# Patient Record
Sex: FEMALE | Race: WHITE | Employment: OTHER | URBAN - METROPOLITAN AREA
[De-identification: names, ages, dates, MRNs, and addresses within clinical notes are randomized per-mention and may not be internally consistent; named-entity substitution may affect disease eponyms.]

---

## 2017-09-18 ENCOUNTER — HOSPITAL ENCOUNTER (OUTPATIENT)
Dept: MAMMOGRAPHY | Age: 72
Discharge: HOME | End: 2017-09-18
Attending: OBSTETRICS & GYNECOLOGY | Admitting: OBSTETRICS & GYNECOLOGY

## 2017-09-18 DIAGNOSIS — Z12.31 SCREENING MAMMOGRAM, ENCOUNTER FOR: ICD-10-CM

## 2018-09-19 ENCOUNTER — HOSPITAL ENCOUNTER (OUTPATIENT)
Dept: MAMMOGRAPHY | Age: 73
Discharge: HOME | End: 2018-09-19
Payer: MEDICARE

## 2018-09-19 DIAGNOSIS — Z12.31 SCREENING MAMMOGRAM, ENCOUNTER FOR: ICD-10-CM

## 2018-09-19 PROCEDURE — 77067 SCR MAMMO BI INCL CAD: CPT

## 2019-09-23 ENCOUNTER — HOSPITAL ENCOUNTER (OUTPATIENT)
Dept: MAMMOGRAPHY | Age: 74
Discharge: HOME | End: 2019-09-23
Payer: MEDICARE

## 2019-09-23 DIAGNOSIS — Z12.31 ENCOUNTER FOR SCREENING MAMMOGRAM FOR BREAST CANCER: ICD-10-CM

## 2019-09-23 PROCEDURE — 77063 BREAST TOMOSYNTHESIS BI: CPT

## 2019-09-30 ENCOUNTER — HOSPITAL ENCOUNTER (OUTPATIENT)
Dept: WOMENS IMAGING | Age: 74
Discharge: HOME | End: 2019-09-30
Payer: MEDICARE

## 2019-09-30 DIAGNOSIS — R92.8 ABNORMAL MAMMOGRAM: ICD-10-CM

## 2019-09-30 PROCEDURE — G0279 TOMOSYNTHESIS, MAMMO: HCPCS

## 2020-10-12 ENCOUNTER — HOSPITAL ENCOUNTER (OUTPATIENT)
Dept: WOMENS IMAGING | Age: 75
Discharge: HOME | End: 2020-10-12
Payer: MEDICARE

## 2020-10-12 PROCEDURE — 77063 BREAST TOMOSYNTHESIS BI: CPT

## 2021-10-13 ENCOUNTER — HOSPITAL ENCOUNTER (OUTPATIENT)
Dept: WOMENS IMAGING | Age: 76
Discharge: HOME | End: 2021-10-13
Payer: MEDICARE

## 2021-10-13 DIAGNOSIS — Z12.31 ENCOUNTER FOR SCREENING MAMMOGRAM FOR BREAST CANCER: ICD-10-CM

## 2021-10-13 PROCEDURE — 77063 BREAST TOMOSYNTHESIS BI: CPT

## 2022-10-18 ENCOUNTER — HOSPITAL ENCOUNTER (OUTPATIENT)
Dept: WOMENS IMAGING | Age: 77
Discharge: HOME | End: 2022-10-18
Payer: MEDICARE

## 2022-10-18 DIAGNOSIS — Z12.31 SCREENING MAMMOGRAM, ENCOUNTER FOR: ICD-10-CM

## 2022-10-18 PROCEDURE — 77063 BREAST TOMOSYNTHESIS BI: CPT

## 2023-10-24 ENCOUNTER — HOSPITAL ENCOUNTER (OUTPATIENT)
Dept: WOMENS IMAGING | Age: 78
Discharge: HOME | End: 2023-10-24
Payer: MEDICARE

## 2023-10-24 DIAGNOSIS — Z12.31 SCREENING MAMMOGRAM, ENCOUNTER FOR: ICD-10-CM

## 2023-10-24 PROCEDURE — 77063 BREAST TOMOSYNTHESIS BI: CPT

## 2023-11-06 ENCOUNTER — HOSPITAL ENCOUNTER (OUTPATIENT)
Dept: ULTRASOUND IMAGING | Age: 78
Discharge: HOME | End: 2023-11-06
Payer: MEDICARE

## 2023-11-06 ENCOUNTER — HOSPITAL ENCOUNTER (OUTPATIENT)
Dept: MAMMOGRAPHY | Age: 78
Discharge: HOME | End: 2023-11-06
Payer: MEDICARE

## 2023-11-06 DIAGNOSIS — R92.8 ABNORMAL MAMMOGRAM OF RIGHT BREAST: ICD-10-CM

## 2023-11-06 PROCEDURE — G0279 TOMOSYNTHESIS, MAMMO: HCPCS

## 2023-11-06 PROCEDURE — 76642 ULTRASOUND BREAST LIMITED: CPT

## 2024-05-21 ENCOUNTER — HOSPITAL ENCOUNTER (OUTPATIENT)
Dept: WOMENS IMAGING | Age: 79
Discharge: HOME | End: 2024-05-21
Payer: MEDICARE

## 2024-05-21 DIAGNOSIS — R92.8 ABNORMAL MAMMOGRAM: ICD-10-CM

## 2024-05-21 PROCEDURE — G0279 TOMOSYNTHESIS, MAMMO: HCPCS

## 2024-05-21 PROCEDURE — 76642 ULTRASOUND BREAST LIMITED: CPT

## 2024-05-21 NOTE — PROGRESS NOTES
biopsy.  What are the risks?     Bleeding: You may have some bleeding which can cause bruising, swelling,    or a bleeding under your skin.                  Infection:  Signs of infection are redness, swelling, heat, or increasing pain    at the biopsy Site.        Sample size not adequate: This would require repeating the biopsy.   What happens after the test?    The nurse will review your post biopsy instructions which include:         Placing an ice pack in your bra.     Wearing a firm fitting bra.      You may use Tylenol (Acetaminiphen) for discomfort.    Lab results take about 2-3 business days.   The Nurse Navigator or your doctor will call you with the results.        Ultrasound Breast Biopsy:     (Please arrive 15 minutes early)                                                 [x] Blood thinner history reviewed with patient    [x] Take all your other medications on your normal routine schedule.    [x] You may eat and drink as normal before your biopsy.    [x] You may drive yourself.    [x] No heavy lifting or exercise for 48 hours after the biopsy.    [x] Printed Pre Ultrasound Biopsy Instructions were provided, reviewed with the patient and all questions were answered.    [facility] Information:   Should you experience any significant changes in your health or have questions about your care, please contact:  Jasmin Logan, Breast Nurse Navigator at [facility] [place]:  [phone]  Monday-Friday.  If you need help with your care outside these hours and cannot wait until we are again available, contact your Physician or go to the hospital emergency room.   [x] Patient/POA/Caregiver verbalized understanding of instructions.       Electronically signed by Jasmin Logan RN on 5/21/2024 at 3:19 PM

## 2024-05-31 ENCOUNTER — HOSPITAL ENCOUNTER (OUTPATIENT)
Dept: WOMENS IMAGING | Age: 79
Discharge: HOME | End: 2024-05-31
Attending: FAMILY MEDICINE
Payer: MEDICARE

## 2024-05-31 ENCOUNTER — HOSPITAL ENCOUNTER (OUTPATIENT)
Dept: WOMENS IMAGING | Age: 79
Discharge: HOME | End: 2024-05-31
Payer: MEDICARE

## 2024-05-31 DIAGNOSIS — R92.8 ABNORMAL MAMMOGRAM OF RIGHT BREAST: ICD-10-CM

## 2024-05-31 PROCEDURE — 77065 DX MAMMO INCL CAD UNI: CPT

## 2024-05-31 PROCEDURE — 19083 BX BREAST 1ST LESION US IMAG: CPT

## 2024-05-31 NOTE — PROGRESS NOTES
Patient in [facility] for breast biopsy. Radiologist reviewed procedure with patient, consent signed. Patient tolerated procedure well. Compression held. Site cleansed with chloraprep, steri strips and dry dressing applied. Ice pack provided. Reviewed discharge instructions with patient. Patient verbalized understanding and agreed to contact the Breast Navigator with any questions. Patient was A&Ox3 and steady on feet and discharged to waiting area.      [facility]   Discharge Instructions  [facility]  [address]  [address]  Telephone: [phone]   FAX [phone]    NAME:  Es Hi  YOB: 1945  GENDER: female  MEDICAL RECORD NUMBER:  [id number]  TODAY'S DATE:  5/31/2024    Discharge Instructions Breast Center:    Post Breast Biopsy Instructions     [x] You may remove your outer dressing in 24 hours and you may shower. The surgical glue will fall off after 7 days. Do not pick, scratch, or rub the film.     [x] Place a cold pack inside your bra on top of the dressing for at least 3 hours, removing it every 15 minutes for 15 minutes after your biopsy.     [x] Wear a firm fitting bra for at least 24 hours after your biopsy, including while you sleep.    [x] Place an ice pack inside your bra on top of the dressing for at least 3 hours, removing it every 15 minutes for 15 minutes after your biopsy.    [x] You may resume your held medications tomorrow, unless otherwise directed by your physician.    [x] Your physician has instructed you to take Tylenol (Acetaminophen) the day of your biopsy for any discomfort.    [x] Watch for excessive bleeding. If bleeding occurs apply pressure to site. Watch for signs of infection, increased pain, redness, swelling and heat.  If this occurs call your physician.     [x] Do not participate in any strenuous exercise for 48 hours after your biopsy and do not lift, pull or push anything over 5-10 lbs.      [x]

## 2024-06-04 ENCOUNTER — TELEPHONE (OUTPATIENT)
Dept: WOMENS IMAGING | Age: 79
End: 2024-06-04

## 2024-06-04 NOTE — TELEPHONE ENCOUNTER
Pathology for breast biopsy complete, radiologist confirms concordance.     Reviewed breast biopsy results with patient and answered all questions. The radiologist is recommending that the patient see a breast surgeon regarding biopsy results.  Per patient request, referral made to [facility].  Update sent to Jake Purcell MD.  Breast Navigator will remain available for any questions. Pt verbalized understanding.      TIBURCIO Luciano, CN-BM  Breast Health Patient Navigator  [facility]  [phone]

## 2024-06-13 NOTE — PROGRESS NOTES
Es HERRING Laurarhonda    Age 79 y.o.    female    1945    MRN [id number]    6/25/2024  Arrival Time_____________  OR Time____________117 Min     Procedure(s):  RIGHT BREAST RADIOFREQUENCY TAG LOCALIZED LUMPECTOMY                      General   Surgeon(s):  Sung Taylor, MD      DAY ADMIT ___  SDS/OP ___  OUTPT IN BED ___        Phone [phone] (home)                  PCP _____________________ Phone_________________ Epic ( ) Epic CE ( ) Appt ________    NOTES: _________________________________________ Consult/Cardio _______________    ____________________________________________________________________________    ____________________________________________________________________________  PAT APPT DATE:________ TIME: ________  FAXED QAD: _______  (__) H&P w/ Hospitalist    (__) PAT orders in EPIC    (__) Meet with PAT nurse  __________________________________________________________________________  Preop Nurse phone screen complete: _____________  (__) CBC     (__) W/ DIFF ___________  (__) CT CHEST  __________   (__) Hgb A1C    ___________  (__) CHEST X RAY   __________  (__) LIPID PROFILE  ___________  (__) EKG   __________  (__) PT-INR / APTT  ___________  (__) PFT's   __________  (__) BMP   ___________  (__) CAROTIDS  __________  (__) CMP   ___________  (__) VEIN MAPPING  __________  (__) U/A   ___________  ( X ) HISTORY & PHYSICAL __________  (__) URINE C & S  ___________  (__) CARDIAC CLEARANCE __________  (__) U/A W/ FLEX  ___________  (__) PULM. CLEARANCE __________  (__) SERUM PREGNANCY ___________  (__) Preop Orders in EPIC __________  (__) TYPE & SCREEN __________repeat ( ) (__)  __________________ __________  (__) Albumin   ___________  (__)  __________________ __________  (__) TRANSFERRIN  ___________  (__)  __________________ __________  (__) LIVER PROFILE  ___________  (__) URINE PREG DOS __________  (__) MRSA NASAL SWAB ___________  (__) BLOOD SUGAR DOS __________  (__) SED

## 2024-06-14 ENCOUNTER — HOSPITAL ENCOUNTER (OUTPATIENT)
Dept: WOMENS IMAGING | Age: 79
Discharge: HOME | End: 2024-06-14
Payer: MEDICARE

## 2024-06-14 DIAGNOSIS — C50.911 INFILTRATING DUCTAL CARCINOMA OF RIGHT BREAST (HCC): ICD-10-CM

## 2024-06-14 DIAGNOSIS — R92.8 ABNORMAL FINDING ON BREAST IMAGING: ICD-10-CM

## 2024-06-14 PROCEDURE — 2709999900 US PLACE BREAST LOC DEVICE 1ST LESION RIGHT

## 2024-06-14 PROCEDURE — 77065 DX MAMMO INCL CAD UNI: CPT

## 2024-06-19 NOTE — DISCHARGE INSTRUCTIONS
Postoperative Instructions for Breast Surgery (Lumpectomy)  These are general guidelines to help assist in recovery after breast surgery. Please keep in mind all patients recover differently, so please call the office with any questions, [phone] ([phone]).    General guidelines   Please rest when you feel tired, although it is important to be up and about intermittently.  Wear a comfortable sports bra day and night for the next 48-72 hours to help alleviate any discomfort, as well as maintain pressure to avoid a postoperative seroma (fluid collection).  Blood thinning medications, such as warfarin or aspirin, can typically be resumed 48 hours after surgery. This should be discussed with your surgeon and prescribing physician.  If you had a sentinel lymph node procedure, it is common to have an interval of blue urine and/or stool and blue skin changes of the breast.  Final pathology will take 3-5 business days to result. The breast surgery office will call you with the results when they are known.    Pain management  You may feel mild to moderate discomfort when the local anesthesia wears off.  You may apply ice to your surgical site for 10 minutes at a time every hour while you are awake. Do not fall asleep with the ice in place  You may be given a prescription for a narcotic pain medication  Some patients experience very little discomfort and they prefer not to use the prescription  You may take Tylenol or extra strength Tylenol instead of the narcotic, but be aware that many pain medications also contain Tylenol so do not take both  AVOID aspirin, fish oil, Excedrin, Advil, Motrin, ibuprofen, and other NSAIDS immediately after surgery for at least 24 hours.  Narcotic pain medication may cause constipation. Make sure to keep well hydrated, ambulate, and eat high-fiber foods to help avoid constipation. You may use over-the-counter medications for constipation, such as colace or senna  You should not consume

## 2024-06-20 RX ORDER — POTASSIUM CHLORIDE 750 MG/1
10 TABLET, EXTENDED RELEASE ORAL 2 TIMES DAILY
COMMUNITY

## 2024-06-20 RX ORDER — LEVOTHYROXINE SODIUM 88 UG/1
88 TABLET ORAL DAILY
COMMUNITY

## 2024-06-20 NOTE — PROGRESS NOTES
Surgery Date and Time: 6/25/24 @ 09:00 am   Arrival Time:07:00 am    The instructions given when and if a patient needs to stop oral intake prior to surgery varies. Follow the instructions you were given by your    Surgeon or RN during the Pre-op call.       __X__Nothing to eat or to drink after Midnight the night before the surgery. NO gum, mints, candy or ice chips day of surgery.                  Only take the following medications with a small sip of water the morning of surgery:  atenolol      Aspirin, Ibuprofen, Advil, Naproxen, Vitamin E and other Anti-inflammatory products and supplements should be stopped for 5 -7days before surgery      or as directed by your physician.          - Do not smoke or vape, and do not drink any alcoholic beverages 24 hours prior to surgery, this includes NA Beer. Refrain from using any recreational drugs,     including non-prescribed prescription drugs.     -You may brush your teeth and gargle the morning of surgery.  DO NOT SWALLOW WATER.    -You MUST plan for a responsible adult to stay on site while you are here and take you home after your surgery. You will not be allowed to leave alone or drive               yourself home. It is requested someone stay with you the first 24 hrs. Your surgery will be cancelled if you do not have a ride home with a responsible adult.    -A parent/legal guardian must accompany a child scheduled for surgery and plan to stay at the hospital until the child is discharged.  Please do not bring other                children with you.    -Please wear simple, loose-fitting clothing to the hospital. Do not bring valuables (money, credit cards, checkbooks, etc.) Do not wear any makeup (including                no eye makeup) and no nail polish if applicable.             - DO NOT wear any jewelry or body piercings day of surgery.  All body piercing jewelry must be removed.             - If you have dentures they will be removed before going to the OR;

## 2024-06-23 ENCOUNTER — ANESTHESIA EVENT (OUTPATIENT)
Dept: OPERATING ROOM | Age: 79
End: 2024-06-23
Payer: MEDICARE

## 2024-06-25 ENCOUNTER — HOSPITAL ENCOUNTER (OUTPATIENT)
Dept: WOMENS IMAGING | Age: 79
Discharge: HOME | End: 2024-06-25
Payer: MEDICARE

## 2024-06-25 ENCOUNTER — ANESTHESIA (OUTPATIENT)
Dept: OPERATING ROOM | Age: 79
End: 2024-06-25
Payer: MEDICARE

## 2024-06-25 ENCOUNTER — HOSPITAL ENCOUNTER (OUTPATIENT)
Age: 79
Setting detail: OUTPATIENT SURGERY
Discharge: HOME | End: 2024-06-25
Attending: STUDENT IN AN ORGANIZED HEALTH CARE EDUCATION/TRAINING PROGRAM | Admitting: STUDENT IN AN ORGANIZED HEALTH CARE EDUCATION/TRAINING PROGRAM
Payer: MEDICARE

## 2024-06-25 VITALS
SYSTOLIC BLOOD PRESSURE: 142 MMHG | HEIGHT: 63 IN | WEIGHT: 154 LBS | DIASTOLIC BLOOD PRESSURE: 55 MMHG | RESPIRATION RATE: 11 BRPM | BODY MASS INDEX: 27.29 KG/M2 | OXYGEN SATURATION: 98 % | TEMPERATURE: 97.9 F | HEART RATE: 53 BPM

## 2024-06-25 DIAGNOSIS — Z17.0 MALIGNANT NEOPLASM OF RIGHT BREAST IN FEMALE, ESTROGEN RECEPTOR POSITIVE, UNSPECIFIED SITE OF BREAST (HCC): Primary | ICD-10-CM

## 2024-06-25 DIAGNOSIS — C50.911 MALIGNANT NEOPLASM OF RIGHT BREAST IN FEMALE, ESTROGEN RECEPTOR POSITIVE, UNSPECIFIED SITE OF BREAST (HCC): Primary | ICD-10-CM

## 2024-06-25 DIAGNOSIS — C50.111 MALIGNANT NEOPLASM OF CENTRAL PORTION OF RIGHT FEMALE BREAST, UNSPECIFIED ESTROGEN RECEPTOR STATUS (HCC): ICD-10-CM

## 2024-06-25 DIAGNOSIS — C50.911 INFILTRATING DUCTAL CARCINOMA OF RIGHT BREAST (HCC): ICD-10-CM

## 2024-06-25 LAB
ANION GAP SERPL CALCULATED.3IONS-SCNC: 12 MMOL/L (ref 3–16)
BUN SERPL-MCNC: 8 MG/DL (ref 7–20)
CALCIUM SERPL-MCNC: 9.1 MG/DL (ref 8.3–10.6)
CHLORIDE SERPL-SCNC: 93 MMOL/L (ref 99–110)
CO2 SERPL-SCNC: 28 MMOL/L (ref 21–32)
CREAT SERPL-MCNC: <0.5 MG/DL (ref 0.6–1.2)
GFR SERPLBLD CREATININE-BSD FMLA CKD-EPI: >90 ML/MIN/{1.73_M2}
GLUCOSE SERPL-MCNC: 107 MG/DL (ref 70–99)
POTASSIUM SERPL-SCNC: 3.2 MMOL/L (ref 3.5–5.1)
SODIUM SERPL-SCNC: 133 MMOL/L (ref 136–145)

## 2024-06-25 PROCEDURE — 88307 TISSUE EXAM BY PATHOLOGIST: CPT

## 2024-06-25 PROCEDURE — 7100000011 HC PHASE II RECOVERY - ADDTL 15 MIN: Performed by: STUDENT IN AN ORGANIZED HEALTH CARE EDUCATION/TRAINING PROGRAM

## 2024-06-25 PROCEDURE — 2500000003 HC RX 250 WO HCPCS: Performed by: NURSE ANESTHETIST, CERTIFIED REGISTERED

## 2024-06-25 PROCEDURE — 6360000002 HC RX W HCPCS: Performed by: STUDENT IN AN ORGANIZED HEALTH CARE EDUCATION/TRAINING PROGRAM

## 2024-06-25 PROCEDURE — 2580000003 HC RX 258: Performed by: STUDENT IN AN ORGANIZED HEALTH CARE EDUCATION/TRAINING PROGRAM

## 2024-06-25 PROCEDURE — 3700000001 HC ADD 15 MINUTES (ANESTHESIA): Performed by: STUDENT IN AN ORGANIZED HEALTH CARE EDUCATION/TRAINING PROGRAM

## 2024-06-25 PROCEDURE — 2720000010 HC SURG SUPPLY STERILE: Performed by: STUDENT IN AN ORGANIZED HEALTH CARE EDUCATION/TRAINING PROGRAM

## 2024-06-25 PROCEDURE — 7100000000 HC PACU RECOVERY - FIRST 15 MIN: Performed by: STUDENT IN AN ORGANIZED HEALTH CARE EDUCATION/TRAINING PROGRAM

## 2024-06-25 PROCEDURE — 3600000014 HC SURGERY LEVEL 4 ADDTL 15MIN: Performed by: STUDENT IN AN ORGANIZED HEALTH CARE EDUCATION/TRAINING PROGRAM

## 2024-06-25 PROCEDURE — A4217 STERILE WATER/SALINE, 500 ML: HCPCS | Performed by: STUDENT IN AN ORGANIZED HEALTH CARE EDUCATION/TRAINING PROGRAM

## 2024-06-25 PROCEDURE — 6360000002 HC RX W HCPCS: Performed by: NURSE ANESTHETIST, CERTIFIED REGISTERED

## 2024-06-25 PROCEDURE — 3600000004 HC SURGERY LEVEL 4 BASE: Performed by: STUDENT IN AN ORGANIZED HEALTH CARE EDUCATION/TRAINING PROGRAM

## 2024-06-25 PROCEDURE — 80048 BASIC METABOLIC PNL TOTAL CA: CPT

## 2024-06-25 PROCEDURE — 76098 X-RAY EXAM SURGICAL SPECIMEN: CPT

## 2024-06-25 PROCEDURE — 7100000010 HC PHASE II RECOVERY - FIRST 15 MIN: Performed by: STUDENT IN AN ORGANIZED HEALTH CARE EDUCATION/TRAINING PROGRAM

## 2024-06-25 PROCEDURE — 7100000001 HC PACU RECOVERY - ADDTL 15 MIN: Performed by: STUDENT IN AN ORGANIZED HEALTH CARE EDUCATION/TRAINING PROGRAM

## 2024-06-25 PROCEDURE — 2580000003 HC RX 258: Performed by: ANESTHESIOLOGY

## 2024-06-25 PROCEDURE — 3700000000 HC ANESTHESIA ATTENDED CARE: Performed by: STUDENT IN AN ORGANIZED HEALTH CARE EDUCATION/TRAINING PROGRAM

## 2024-06-25 PROCEDURE — 6370000000 HC RX 637 (ALT 250 FOR IP): Performed by: ANESTHESIOLOGY

## 2024-06-25 PROCEDURE — 2709999900 HC NON-CHARGEABLE SUPPLY: Performed by: STUDENT IN AN ORGANIZED HEALTH CARE EDUCATION/TRAINING PROGRAM

## 2024-06-25 RX ORDER — DIPHENHYDRAMINE HYDROCHLORIDE 50 MG/ML
12.5 INJECTION INTRAMUSCULAR; INTRAVENOUS
Status: DISCONTINUED | OUTPATIENT
Start: 2024-06-25 | End: 2024-06-25 | Stop reason: HOSPADM

## 2024-06-25 RX ORDER — SODIUM CHLORIDE 0.9 % (FLUSH) 0.9 %
5-40 SYRINGE (ML) INJECTION PRN
Status: DISCONTINUED | OUTPATIENT
Start: 2024-06-25 | End: 2024-06-25 | Stop reason: HOSPADM

## 2024-06-25 RX ORDER — PHENYLEPHRINE HCL IN 0.9% NACL 1 MG/10 ML
SYRINGE (ML) INTRAVENOUS PRN
Status: DISCONTINUED | OUTPATIENT
Start: 2024-06-25 | End: 2024-06-25 | Stop reason: SDUPTHER

## 2024-06-25 RX ORDER — GLYCOPYRROLATE 0.2 MG/ML
INJECTION INTRAMUSCULAR; INTRAVENOUS PRN
Status: DISCONTINUED | OUTPATIENT
Start: 2024-06-25 | End: 2024-06-25 | Stop reason: SDUPTHER

## 2024-06-25 RX ORDER — ONDANSETRON 2 MG/ML
4 INJECTION INTRAMUSCULAR; INTRAVENOUS
Status: DISCONTINUED | OUTPATIENT
Start: 2024-06-25 | End: 2024-06-25 | Stop reason: HOSPADM

## 2024-06-25 RX ORDER — LIDOCAINE HYDROCHLORIDE 10 MG/ML
2 INJECTION, SOLUTION INFILTRATION; PERINEURAL
Status: DISCONTINUED | OUTPATIENT
Start: 2024-06-25 | End: 2024-06-25 | Stop reason: HOSPADM

## 2024-06-25 RX ORDER — ONDANSETRON 2 MG/ML
INJECTION INTRAMUSCULAR; INTRAVENOUS PRN
Status: DISCONTINUED | OUTPATIENT
Start: 2024-06-25 | End: 2024-06-25 | Stop reason: SDUPTHER

## 2024-06-25 RX ORDER — NALOXONE HYDROCHLORIDE 0.4 MG/ML
INJECTION, SOLUTION INTRAMUSCULAR; INTRAVENOUS; SUBCUTANEOUS PRN
Status: DISCONTINUED | OUTPATIENT
Start: 2024-06-25 | End: 2024-06-25 | Stop reason: HOSPADM

## 2024-06-25 RX ORDER — MAGNESIUM HYDROXIDE 1200 MG/15ML
LIQUID ORAL CONTINUOUS PRN
Status: COMPLETED | OUTPATIENT
Start: 2024-06-25 | End: 2024-06-25

## 2024-06-25 RX ORDER — OXYCODONE HYDROCHLORIDE 5 MG/1
5 TABLET ORAL EVERY 6 HOURS PRN
Qty: 12 TABLET | Refills: 0 | Status: SHIPPED | OUTPATIENT
Start: 2024-06-25 | End: 2024-06-28

## 2024-06-25 RX ORDER — BUPIVACAINE HYDROCHLORIDE 2.5 MG/ML
INJECTION, SOLUTION EPIDURAL; INFILTRATION; INTRACAUDAL PRN
Status: DISCONTINUED | OUTPATIENT
Start: 2024-06-25 | End: 2024-06-25 | Stop reason: ALTCHOICE

## 2024-06-25 RX ORDER — SODIUM CHLORIDE 9 MG/ML
INJECTION, SOLUTION INTRAVENOUS PRN
Status: DISCONTINUED | OUTPATIENT
Start: 2024-06-25 | End: 2024-06-25 | Stop reason: HOSPADM

## 2024-06-25 RX ORDER — OXYCODONE HYDROCHLORIDE 5 MG/1
5 TABLET ORAL PRN
Status: COMPLETED | OUTPATIENT
Start: 2024-06-25 | End: 2024-06-25

## 2024-06-25 RX ORDER — SODIUM CHLORIDE 0.9 % (FLUSH) 0.9 %
5-40 SYRINGE (ML) INJECTION EVERY 12 HOURS SCHEDULED
Status: DISCONTINUED | OUTPATIENT
Start: 2024-06-25 | End: 2024-06-25 | Stop reason: HOSPADM

## 2024-06-25 RX ORDER — LABETALOL HYDROCHLORIDE 5 MG/ML
5 INJECTION, SOLUTION INTRAVENOUS EVERY 10 MIN PRN
Status: DISCONTINUED | OUTPATIENT
Start: 2024-06-25 | End: 2024-06-25 | Stop reason: HOSPADM

## 2024-06-25 RX ORDER — SODIUM CHLORIDE, SODIUM LACTATE, POTASSIUM CHLORIDE, CALCIUM CHLORIDE 600; 310; 30; 20 MG/100ML; MG/100ML; MG/100ML; MG/100ML
INJECTION, SOLUTION INTRAVENOUS CONTINUOUS
Status: DISCONTINUED | OUTPATIENT
Start: 2024-06-25 | End: 2024-06-25 | Stop reason: HOSPADM

## 2024-06-25 RX ORDER — MEPERIDINE HYDROCHLORIDE 50 MG/ML
12.5 INJECTION INTRAMUSCULAR; INTRAVENOUS; SUBCUTANEOUS EVERY 5 MIN PRN
Status: DISCONTINUED | OUTPATIENT
Start: 2024-06-25 | End: 2024-06-25 | Stop reason: HOSPADM

## 2024-06-25 RX ORDER — OXYCODONE HYDROCHLORIDE 5 MG/1
10 TABLET ORAL PRN
Status: COMPLETED | OUTPATIENT
Start: 2024-06-25 | End: 2024-06-25

## 2024-06-25 RX ORDER — LIDOCAINE HYDROCHLORIDE 20 MG/ML
INJECTION, SOLUTION EPIDURAL; INFILTRATION; INTRACAUDAL; PERINEURAL PRN
Status: DISCONTINUED | OUTPATIENT
Start: 2024-06-25 | End: 2024-06-25 | Stop reason: SDUPTHER

## 2024-06-25 RX ORDER — CEFAZOLIN SODIUM IN 0.9 % NACL 2 G/100 ML
2000 PLASTIC BAG, INJECTION (ML) INTRAVENOUS
Status: COMPLETED | OUTPATIENT
Start: 2024-06-25 | End: 2024-06-25

## 2024-06-25 RX ORDER — PROPOFOL 10 MG/ML
INJECTION, EMULSION INTRAVENOUS PRN
Status: DISCONTINUED | OUTPATIENT
Start: 2024-06-25 | End: 2024-06-25 | Stop reason: SDUPTHER

## 2024-06-25 RX ORDER — FENTANYL CITRATE 50 UG/ML
INJECTION, SOLUTION INTRAMUSCULAR; INTRAVENOUS PRN
Status: DISCONTINUED | OUTPATIENT
Start: 2024-06-25 | End: 2024-06-25 | Stop reason: SDUPTHER

## 2024-06-25 RX ADMIN — Medication 100 MCG: at 09:43

## 2024-06-25 RX ADMIN — SODIUM CHLORIDE, SODIUM LACTATE, POTASSIUM CHLORIDE, AND CALCIUM CHLORIDE: .6; .31; .03; .02 INJECTION, SOLUTION INTRAVENOUS at 10:19

## 2024-06-25 RX ADMIN — ONDANSETRON 4 MG: 2 INJECTION INTRAMUSCULAR; INTRAVENOUS at 09:12

## 2024-06-25 RX ADMIN — Medication 100 MCG: at 09:20

## 2024-06-25 RX ADMIN — FENTANYL CITRATE 25 MCG: 50 INJECTION, SOLUTION INTRAMUSCULAR; INTRAVENOUS at 09:06

## 2024-06-25 RX ADMIN — LIDOCAINE HYDROCHLORIDE 50 MG: 20 INJECTION, SOLUTION EPIDURAL; INFILTRATION; INTRACAUDAL; PERINEURAL at 09:06

## 2024-06-25 RX ADMIN — PROPOFOL 80 MG: 10 INJECTION, EMULSION INTRAVENOUS at 09:06

## 2024-06-25 RX ADMIN — Medication 100 MCG: at 09:49

## 2024-06-25 RX ADMIN — Medication 100 MCG: at 09:31

## 2024-06-25 RX ADMIN — SODIUM CHLORIDE, SODIUM LACTATE, POTASSIUM CHLORIDE, AND CALCIUM CHLORIDE: .6; .31; .03; .02 INJECTION, SOLUTION INTRAVENOUS at 08:57

## 2024-06-25 RX ADMIN — Medication 2000 MG: at 09:14

## 2024-06-25 RX ADMIN — GLYCOPYRROLATE 0.2 MG: 0.2 INJECTION, SOLUTION INTRAMUSCULAR; INTRAVENOUS at 09:03

## 2024-06-25 RX ADMIN — OXYCODONE HYDROCHLORIDE 5 MG: 5 TABLET ORAL at 11:30

## 2024-06-25 RX ADMIN — FENTANYL CITRATE 25 MCG: 50 INJECTION, SOLUTION INTRAMUSCULAR; INTRAVENOUS at 09:01

## 2024-06-25 ASSESSMENT — PAIN - FUNCTIONAL ASSESSMENT: PAIN_FUNCTIONAL_ASSESSMENT: 0-10

## 2024-06-25 ASSESSMENT — PAIN SCALES - GENERAL: PAINLEVEL_OUTOF10: 6

## 2024-06-25 ASSESSMENT — PAIN DESCRIPTION - LOCATION: LOCATION: BREAST

## 2024-06-25 ASSESSMENT — PAIN DESCRIPTION - ORIENTATION: ORIENTATION: RIGHT

## 2024-06-25 NOTE — ANESTHESIA PRE PROCEDURE
Department of Anesthesiology  Preprocedure Note       Name:  Es Hi   Age:  79 y.o.  :  1945                                          MRN:  [id number]         Date:  2024      Surgeon: Surgeon(s):  Sung Taylor MD    Procedure: Procedure(s):  RIGHT BREAST RADIOFREQUENCY TAG LOCALIZED LUMPECTOMY    Medications prior to admission:   Prior to Admission medications    Medication Sig Start Date End Date Taking? Authorizing Provider   levothyroxine (SYNTHROID) 88 MCG tablet Take 1 tablet by mouth Daily   Yes Leticia Burnham MD   Rosuvastatin Calcium 40 MG CPSP Take 40 mg by mouth every evening   Yes Leticia Burnham MD   potassium chloride (KLOR-CON M) 10 MEQ extended release tablet Take 1 tablet by mouth 2 times daily   Yes Leticia Burnham MD   Coenzyme Q10 (COQ10 PO) Take 1 tablet by mouth daily    Leticia Burnham MD   hydrochlorothiazide (HYDRODIURIL) 25 MG tablet Take 1 tablet by mouth daily    Leticia Burnham MD   atenolol (TENORMIN) 50 MG tablet Take 0.5 tablets by mouth daily    Leticia Burnham MD       Current medications:    Current Facility-Administered Medications   Medication Dose Route Frequency Provider Last Rate Last Admin    ceFAZolin (ANCEF) 2000 mg in 0.9% sodium chloride 100 mL IVPB  2,000 mg IntraVENous On Call to OR Sung Taylor MD        lidocaine 1 % injection 2 mL  2 mL IntraDERmal Once PRN Sahil Maier MD        lactated ringers IV soln infusion   IntraVENous Continuous Sahil Maier MD           Allergies:    Allergies   Allergen Reactions    Atorvastatin Nausea And Vomiting     Tired, leg cramps    Ezetimibe Nausea And Vomiting       Problem List:    Patient Active Problem List   Diagnosis Code    Ocular migraine G43.109       Past Medical History:        Diagnosis Date    AR (allergic rhinitis)     Arthritis     Cancer (HCC)     BREAST CANCER RIGHT    Hearing loss in left ear     Hepatitis     High cholesterol

## 2024-06-25 NOTE — ANESTHESIA POSTPROCEDURE EVALUATION
Department of Anesthesiology  Postprocedure Note    Patient: Es Hi  MRN: [id number]  YOB: 1945  Date of evaluation: 6/25/2024    Procedure Summary       Date: 06/25/24 Room / Location: [facility] OR [address] [place] [address]    Anesthesia Start: 0857 Anesthesia Stop: 1028    Procedure: RIGHT BREAST RADIOFREQUENCY TAG LOCALIZED LUMPECTOMY (Right: Breast) Diagnosis:       Malignant neoplasm of central portion of right female breast, unspecified estrogen receptor status (HCC)      (Malignant neoplasm of central portion of right female breast, unspecified estrogen receptor status (HCC) [C50.111])    Surgeons: Sung Taylor MD Responsible Provider: Bar Serrano MD    Anesthesia Type: general ASA Status: 2            Anesthesia Type: No value filed.    Toney Phase I: Toney Score: 9    Toney Phase II: Toney Score: 10    Anesthesia Post Evaluation    Comments: Postoperative Anesthesia Note    Name:    Es Hi  MRN:      [id number]    Patient Vitals in the past 12 hrs:  06/25/24 1145, Pulse:53, Resp:11, SpO2:98 %  06/25/24 1144, Pulse:(!) 47, Resp:13, SpO2:99 %  06/25/24 1143, Pulse:(!) 48, Resp:15, SpO2:94 %  06/25/24 1142, Pulse:(!) 46, Resp:14, SpO2:93 %  06/25/24 1141, BP:(!) 142/55, Pulse:(!) 44, Resp:14, SpO2:93 %  06/25/24 1140, Pulse:(!) 45, Resp:(!) 0, SpO2:96 %  06/25/24 1139, Pulse:(!) 46, Resp:18, SpO2:95 %  06/25/24 1138, Pulse:50, Resp:13, SpO2:96 %  06/25/24 1137, Temp:97.9 °F (36.6 °C), Temp src:Temporal, Pulse:(!) 49, Resp:21, SpO2:94 %  06/25/24 1136, Pulse:(!) 46, Resp:(!) 0, SpO2:96 %  06/25/24 1135, Pulse:(!) 47, Resp:12, SpO2:98 %  06/25/24 1134, Pulse:(!) 48, Resp:16, SpO2:97 %  06/25/24 1133, Pulse:(!) 48, Resp:21, SpO2:99 %  06/25/24 1132, Pulse:51, Resp:26, SpO2:100 %  06/25/24 1131, Pulse:53, Resp:(!) 7, SpO2:99 %  06/25/24 1130, Pulse:(!) 48, Resp:17, SpO2:97 %  06/25/24 1129, Pulse:(!) 46, Resp:11, SpO2:98 %  06/25/24 1128, Pulse:(!) 45, Resp:(!) 9,

## 2024-06-25 NOTE — OP NOTE
family was notified of intraoperative findings.    Electronically signed by Sung Taylor MD on 6/25/24 at 10:11 AM EDT

## 2024-06-25 NOTE — INTERVAL H&P NOTE
Update History & Physical    The patient's History and Physical of Shannon 10, 2024 was reviewed with the patient and I examined the patient. There was no change. The surgical site was confirmed by the patient and me.     Plan: The risks, benefits, expected outcome, and alternative to the recommended procedure have been discussed with the patient. Patient understands and wants to proceed with the procedure.     Electronically signed by Sung Taylor MD on 6/25/2024 at 8:36 AM

## 2024-06-30 ENCOUNTER — HOSPITAL ENCOUNTER (EMERGENCY)
Age: 79
Discharge: HOME | End: 2024-06-30
Payer: MEDICARE

## 2024-06-30 VITALS
SYSTOLIC BLOOD PRESSURE: 128 MMHG | DIASTOLIC BLOOD PRESSURE: 60 MMHG | TEMPERATURE: 97.2 F | RESPIRATION RATE: 16 BRPM | WEIGHT: 154 LBS | OXYGEN SATURATION: 98 % | HEIGHT: 63 IN | HEART RATE: 56 BPM | BODY MASS INDEX: 27.29 KG/M2

## 2024-06-30 DIAGNOSIS — R21 RASH AND OTHER NONSPECIFIC SKIN ERUPTION: Primary | ICD-10-CM

## 2024-06-30 PROCEDURE — 99283 EMERGENCY DEPT VISIT LOW MDM: CPT

## 2024-06-30 PROCEDURE — 6370000000 HC RX 637 (ALT 250 FOR IP): Performed by: NURSE PRACTITIONER

## 2024-06-30 RX ORDER — PREDNISONE 20 MG/1
20 TABLET ORAL 2 TIMES DAILY
Qty: 10 TABLET | Refills: 0 | Status: SHIPPED | OUTPATIENT
Start: 2024-06-30 | End: 2024-07-05

## 2024-06-30 RX ORDER — VALACYCLOVIR HYDROCHLORIDE 1 G/1
1000 TABLET, FILM COATED ORAL 3 TIMES DAILY
Qty: 21 TABLET | Refills: 0 | Status: SHIPPED | OUTPATIENT
Start: 2024-06-30 | End: 2024-07-07

## 2024-06-30 RX ORDER — VALACYCLOVIR HYDROCHLORIDE 500 MG/1
1000 TABLET, FILM COATED ORAL ONCE
Status: COMPLETED | OUTPATIENT
Start: 2024-06-30 | End: 2024-06-30

## 2024-06-30 RX ORDER — PREDNISONE 20 MG/1
40 TABLET ORAL ONCE
Status: COMPLETED | OUTPATIENT
Start: 2024-06-30 | End: 2024-06-30

## 2024-06-30 RX ADMIN — VALACYCLOVIR 1000 MG: 500 TABLET, FILM COATED ORAL at 12:15

## 2024-06-30 RX ADMIN — PREDNISONE 40 MG: 20 TABLET ORAL at 12:15

## 2024-06-30 ASSESSMENT — PAIN - FUNCTIONAL ASSESSMENT: PAIN_FUNCTIONAL_ASSESSMENT: 0-10

## 2024-06-30 ASSESSMENT — LIFESTYLE VARIABLES
HOW OFTEN DO YOU HAVE A DRINK CONTAINING ALCOHOL: 4 OR MORE TIMES A WEEK
HOW MANY STANDARD DRINKS CONTAINING ALCOHOL DO YOU HAVE ON A TYPICAL DAY: 3 OR 4

## 2024-06-30 NOTE — ED PROVIDER NOTES
[facility] ED  EMERGENCY DEPARTMENT ENCOUNTER        Pt Name: Es Hi  MRN: [id number]  Birthdate 1945  Date of evaluation: 6/30/2024  Provider: DIOGO Jacinto - VERA  PCP: Jake Purcell MD  Note Started: 11:35 AM EDT 6/30/24      CALE. I have evaluated this patient.        CHIEF COMPLAINT       Chief Complaint   Patient presents with    Rash     Red raised rash on right lower leg; itches; painful to touch; had similar rash earlier in June and was treated for Shingles       HISTORY OF PRESENT ILLNESS: 1 or more Elements     History From: Patient     Limitations to history : None    Social Determinants Significantly Affecting Health : None    Chief Complaint: Rash     Es Hi is a 79 y.o. female who presents to the emergency department today with symptoms of rash involving the right lower leg.  States that rash began suddenly.  States that it itches and and reported to nursing staff it was painful to touch but when I asked her about pain she stated really not painful.  She had similar symptoms in June and was told it could be shingles.  She had the rash for approximately 2 days and then it resolved on its own just shortly after taking Valtrex.  She denies any blistering rash.  States that the rash is itchy.  Denies any further involvement of her trunk or further extremity.  No facial involvement.  No groin involvement.  States that she otherwise feels well.    Nursing Notes were all reviewed and agreed with or any disagreements were addressed in the HPI.    REVIEW OF SYSTEMS :      Review of Systems    Positives and Pertinent negatives as per HPI.     SURGICAL HISTORY     Past Surgical History:   Procedure Laterality Date    APPENDECTOMY      BREAST BIOPSY      benign    BREAST LUMPECTOMY Right 6/25/2024    RIGHT BREAST RADIOFREQUENCY TAG LOCALIZED LUMPECTOMY performed by Sung Taylor MD at [facility] OR    CATARACT REMOVAL WITH IMPLANT Left 1/9/15    CATARACT REMOVAL WITH

## 2024-06-30 NOTE — DISCHARGE INSTR - COC
Belongings:[id number]}    RN SIGNATURE:  {Esignature:[id number]}    CASE MANAGEMENT/SOCIAL WORK SECTION    Inpatient Status Date: ***    Readmission Risk Assessment Score:  Readmission Risk              Risk of Unplanned Readmission:  0           Discharging to Facility/ Agency   Name:   Address:  Phone:  Fax:    Dialysis Facility (if applicable)   Name:  Address:  Dialysis Schedule:  Phone:  Fax:    / signature: {Esignature:685569350}    PHYSICIAN SECTION    Prognosis: {Prognosis:5222886577}    Condition at Discharge: { Patient Condition:933717267}    Rehab Potential (if transferring to Rehab): {Prognosis:6904291681}    Recommended Labs or Other Treatments After Discharge: ***    Physician Certification: I certify the above information and transfer of Es Hi  is necessary for the continuing treatment of the diagnosis listed and that she requires {Admit to Appropriate Level of Care:69426} for {GREATER/LESS:427241262} 30 days.     Update Admission H&P: {CHP DME Changes in HandP:143353403}    PHYSICIAN SIGNATURE:  {Esignature:722479730}

## 2024-09-11 ENCOUNTER — HOSPITAL ENCOUNTER (OUTPATIENT)
Dept: WOMENS IMAGING | Age: 79
Discharge: HOME | End: 2024-09-11
Payer: MEDICARE

## 2024-09-11 DIAGNOSIS — N95.9 POST MENOPAUSAL PROBLEMS: ICD-10-CM

## 2024-09-11 PROCEDURE — 77080 DXA BONE DENSITY AXIAL: CPT

## 2024-09-12 ENCOUNTER — HOSPITAL ENCOUNTER (OUTPATIENT)
Age: 79
Discharge: HOME | End: 2024-09-12
Payer: MEDICARE

## 2024-09-12 ENCOUNTER — HOSPITAL ENCOUNTER (OUTPATIENT)
Dept: GENERAL RADIOLOGY | Age: 79
Discharge: HOME | End: 2024-09-12
Payer: MEDICARE

## 2024-09-12 DIAGNOSIS — M94.0 COSTOCHONDRITIS: ICD-10-CM

## 2024-09-12 PROCEDURE — 71101 X-RAY EXAM UNILAT RIBS/CHEST: CPT

## 2024-11-07 ENCOUNTER — HOSPITAL ENCOUNTER (OUTPATIENT)
Dept: WOMENS IMAGING | Age: 79
Discharge: HOME | End: 2024-11-07
Payer: MEDICARE

## 2024-11-07 VITALS — HEIGHT: 62 IN | WEIGHT: 150 LBS | BODY MASS INDEX: 27.6 KG/M2

## 2024-11-07 DIAGNOSIS — Z17.0 ESTROGEN RECEPTOR POSITIVE STATUS (ER+): ICD-10-CM

## 2024-11-07 DIAGNOSIS — C50.111 MALIGNANT NEOPLASM OF CENTRAL PORTION OF RIGHT FEMALE BREAST, UNSPECIFIED ESTROGEN RECEPTOR STATUS (HCC): ICD-10-CM

## 2024-11-07 PROCEDURE — G0279 TOMOSYNTHESIS, MAMMO: HCPCS

## 2025-02-24 NOTE — PROGRESS NOTES
[facility]  DIVISION OF OTOLARYNGOLOGY- HEAD & NECK SURGERY  CONSULT    Patient Name: Es Hi  Medical Record Number:  [id number]  Primary Care Physician:  Jake Purcell MD  Date of Consultation: 2/25/2025    Chief Complaint:   Chief Complaint   Patient presents with    New Patient    Hearing Problem      HISTORY OF PRESENT ILLNESS  Es is a(n) 80 y.o. female who presents for evaluation of hearing loss.  She states this been progressive over the last several years.  She does have a family history of hearing loss in her dad and brother.  She denies any significant noise exposure.  She also complains of nasal congestion and rhinorrhea.  The rhinorrhea is worse when she eats and drinks.  Patient Active Problem List   Diagnosis    Ocular migraine     Past Surgical History:   Procedure Laterality Date    APPENDECTOMY      BREAST BIOPSY      benign    BREAST LUMPECTOMY Right 6/25/2024    RIGHT BREAST RADIOFREQUENCY TAG LOCALIZED LUMPECTOMY performed by Sung Taylor MD at [facility] OR    CATARACT REMOVAL WITH IMPLANT Left 1/9/15    CATARACT REMOVAL WITH IMPLANT Right 2/3/15    COLONOSCOPY  10/29/12    diverticulosis    US BREAST BIOPSY W LOC DEVICE 1ST LESION RIGHT Right 5/31/2024    US BREAST BIOPSY W LOC DEVICE 1ST LESION RIGHT 5/31/2024 Faizan Hamlin MD [facility]    US PLACE BREAST LOC DEVICE 1ST LESION RIGHT Right 6/14/2024    US GUIDED NEEDLE LOC OF RIGHT BREAST 6/14/2024 Faizan Hamlin MD [facility]     Family History   Problem Relation Age of Onset    Breast Cancer Mother 75    Cancer Mother         Thyroid & Breast    Hypertension Mother     Heart Disease Mother     High Cholesterol Mother     Stroke Mother     Depression Mother     Anxiety Disorder Mother     Heart Disease Father     Hypertension Father     High Cholesterol Father     Stroke Father     Heart Disease Sister         CHF     Social History     Socioeconomic History    Marital status:

## 2025-02-25 ENCOUNTER — PROCEDURE VISIT (OUTPATIENT)
Dept: AUDIOLOGY | Age: 80
End: 2025-02-25
Payer: MEDICARE

## 2025-02-25 ENCOUNTER — OFFICE VISIT (OUTPATIENT)
Dept: ENT CLINIC | Age: 80
End: 2025-02-25
Payer: MEDICARE

## 2025-02-25 VITALS
HEIGHT: 62 IN | BODY MASS INDEX: 27.6 KG/M2 | HEART RATE: 48 BPM | WEIGHT: 150 LBS | SYSTOLIC BLOOD PRESSURE: 162 MMHG | DIASTOLIC BLOOD PRESSURE: 69 MMHG

## 2025-02-25 DIAGNOSIS — H93.13 TINNITUS, BILATERAL: ICD-10-CM

## 2025-02-25 DIAGNOSIS — J30.0 VASOMOTOR RHINITIS: ICD-10-CM

## 2025-02-25 DIAGNOSIS — J30.2 SEASONAL ALLERGIES: ICD-10-CM

## 2025-02-25 DIAGNOSIS — H90.3 SENSORINEURAL HEARING LOSS, BILATERAL: Primary | ICD-10-CM

## 2025-02-25 DIAGNOSIS — H93.13 TINNITUS OF BOTH EARS: ICD-10-CM

## 2025-02-25 DIAGNOSIS — H90.3 SENSORINEURAL HEARING LOSS (SNHL) OF BOTH EARS: Primary | ICD-10-CM

## 2025-02-25 PROCEDURE — 1159F MED LIST DOCD IN RCRD: CPT | Performed by: STUDENT IN AN ORGANIZED HEALTH CARE EDUCATION/TRAINING PROGRAM

## 2025-02-25 PROCEDURE — 1123F ACP DISCUSS/DSCN MKR DOCD: CPT | Performed by: STUDENT IN AN ORGANIZED HEALTH CARE EDUCATION/TRAINING PROGRAM

## 2025-02-25 PROCEDURE — 92567 TYMPANOMETRY: CPT | Performed by: AUDIOLOGIST

## 2025-02-25 PROCEDURE — 99204 OFFICE O/P NEW MOD 45 MIN: CPT | Performed by: STUDENT IN AN ORGANIZED HEALTH CARE EDUCATION/TRAINING PROGRAM

## 2025-02-25 PROCEDURE — 1160F RVW MEDS BY RX/DR IN RCRD: CPT | Performed by: STUDENT IN AN ORGANIZED HEALTH CARE EDUCATION/TRAINING PROGRAM

## 2025-02-25 PROCEDURE — 92557 COMPREHENSIVE HEARING TEST: CPT | Performed by: AUDIOLOGIST

## 2025-02-25 RX ORDER — IPRATROPIUM BROMIDE 21 UG/1
2 SPRAY, METERED NASAL EVERY 12 HOURS
Qty: 30 ML | Refills: 3 | Status: SHIPPED | OUTPATIENT
Start: 2025-02-25

## 2025-02-25 RX ORDER — ALENDRONATE SODIUM 70 MG/1
TABLET ORAL
COMMUNITY

## 2025-02-25 RX ORDER — ANASTROZOLE 1 MG/1
1 TABLET ORAL
COMMUNITY
Start: 2024-08-21

## 2025-02-25 RX ORDER — AZELASTINE 1 MG/ML
1 SPRAY, METERED NASAL 2 TIMES DAILY
Qty: 30 ML | Refills: 3 | Status: SHIPPED | OUTPATIENT
Start: 2025-02-25

## 2025-02-25 ASSESSMENT — ENCOUNTER SYMPTOMS
EYE PAIN: 0
SHORTNESS OF BREATH: 0
VOMITING: 0
RHINORRHEA: 1
COUGH: 0
NAUSEA: 0

## 2025-02-25 NOTE — PROGRESS NOTES
Es Hi   1945, 80 y.o. female   [id number]       Referring Provider: Mio Chin DO  Referral Type: In an order in Epic    Reason for Visit: Evaluation of suspected change in hearing and tinnitus    ADULT AUDIOLOGIC EVALUATION      Es Hi is a 80 y.o. female seen today, 2/25/2025 , for an initial audiologic evaluation.  Patient was seen by Mio Chin DO following today's evaluation.     AUDIOLOGIC AND OTHER PERTINENT MEDICAL HISTORY:      Es Hi noted a perceived decline in hearing and bilateral \"buzzing\" tinnitus (left ear perceived worse than right). She notes history of ear infections about 10-11 years ago-denies history of ear surgeries. Patient also reports radiation about 1 year ago and family history of hearing loss in brother/father who reportedly wear hearing aids. No additional significant otologic or medical history was reported.     Es Hi denied otalgia, aural fullness, otorrhea, dizziness, imbalance, history of falls, history of occupational/recreational noise exposure, history of head trauma, and history of ear surgery.    Date: 2/25/2025     IMPRESSIONS:      Today's results revealed a symmetric sensorineural hearing loss with excellent word recognition, bilaterally. Hearing loss significant enough to create hearing difficulty in at least some listening situations. Discussed benefits of amplification. Follow medical recommendations of Mio Chin DO.    ASSESSMENT AND FINDINGS:     Otoscopy revealed: Clear ear canals bilaterally    RIGHT EAR:  Hearing Sensitivity: Moderate sloping to severe sensorineural hearing loss.   Speech Recognition Threshold: 45 dB HL  Word Recognition: Good 88%, based on NU-6 25-word list at 80 dBHL masked using recorded speech stimuli.    Tympanometry: Normal peak pressure with high compliance, Type Ad tympanogram, consistent with hypermobile tympanic membrane.      LEFT EAR:  Hearing Sensitivity: Moderate sloping to severe

## 2025-02-25 NOTE — PATIENT INSTRUCTIONS
keyboard, they are sent over the phone line to a receiving TTY. The message is shown on a monitor.  Use pagers, fax machines, text, and email if it is hard for you to communicate by telephone.  Try to learn a listening technique called speech-reading. It is not lip-reading. You pay attention to people's gestures, expressions, posture, and tone of voice. These clues can help you understand what a person is saying. Face the person you are talking to, and have him or her face you. Make sure the lighting is good. You need to see the other person's face clearly.  Think about counseling if you need help to adjust to your hearing loss.    When should you call for help?  Watch closely for changes in your health, and be sure to contact your doctor if:    You think your hearing is getting worse.     You have new symptoms, such as dizziness or nausea.

## 2025-04-09 NOTE — PROGRESS NOTES
[facility]  DIVISION OF OTOLARYNGOLOGY- HEAD & NECK SURGERY  CONSULT    Patient Name: Es Hi  Medical Record Number:  [id number]  Primary Care Physician:  Jake Purcell MD  Date of Consultation: 4/21/2025    Chief Complaint:   Chief Complaint   Patient presents with    Follow-up     Rhinitis, allergies not seen much change.        HISTORY OF PRESENT ILLNESS  Es is a(n) 80 y.o. female who presents for evaluation of hearing loss.  She states this been progressive over the last several years.  She does have a family history of hearing loss in her dad and brother.  She denies any significant noise exposure.  She also complains of nasal congestion and rhinorrhea.  The rhinorrhea is worse when she eats and drinks.    Interval History 04/21/25  Es states that the vasomotor rhinitis is being well-controlled with the Atrovent.  She believes that her allergies are still acting up.  She is also complaining of decreased sense of smell and taste.  This been present since approximately 2016.    Patient Active Problem List   Diagnosis    Ocular migraine     Past Surgical History:   Procedure Laterality Date    APPENDECTOMY      BREAST BIOPSY      benign    BREAST LUMPECTOMY Right 6/25/2024    RIGHT BREAST RADIOFREQUENCY TAG LOCALIZED LUMPECTOMY performed by Sung Taylor MD at [facility] OR    CATARACT REMOVAL WITH IMPLANT Left 1/9/15    CATARACT REMOVAL WITH IMPLANT Right 2/3/15    COLONOSCOPY  10/29/12    diverticulosis    US BREAST BIOPSY W LOC DEVICE 1ST LESION RIGHT Right 5/31/2024    US BREAST BIOPSY W LOC DEVICE 1ST LESION RIGHT 5/31/2024 Faizan Hamlin MD [facility]    US PLACE BREAST LOC DEVICE 1ST LESION RIGHT Right 6/14/2024    US GUIDED NEEDLE LOC OF RIGHT BREAST 6/14/2024 Faizan Hamlin MD [facility]     Family History   Problem Relation Age of Onset    Breast Cancer Mother 75    Cancer Mother         Thyroid & Breast    Hypertension Mother     Heart Disease Mother

## 2025-04-21 ENCOUNTER — OFFICE VISIT (OUTPATIENT)
Dept: ENT CLINIC | Age: 80
End: 2025-04-21
Payer: MEDICARE

## 2025-04-21 VITALS
DIASTOLIC BLOOD PRESSURE: 69 MMHG | HEART RATE: 53 BPM | WEIGHT: 150 LBS | SYSTOLIC BLOOD PRESSURE: 150 MMHG | BODY MASS INDEX: 27.6 KG/M2 | HEIGHT: 62 IN

## 2025-04-21 DIAGNOSIS — J30.2 SEASONAL ALLERGIES: ICD-10-CM

## 2025-04-21 DIAGNOSIS — J30.0 VASOMOTOR RHINITIS: Primary | ICD-10-CM

## 2025-04-21 DIAGNOSIS — R43.0 ANOSMIA: ICD-10-CM

## 2025-04-21 PROCEDURE — 1159F MED LIST DOCD IN RCRD: CPT | Performed by: STUDENT IN AN ORGANIZED HEALTH CARE EDUCATION/TRAINING PROGRAM

## 2025-04-21 PROCEDURE — 1123F ACP DISCUSS/DSCN MKR DOCD: CPT | Performed by: STUDENT IN AN ORGANIZED HEALTH CARE EDUCATION/TRAINING PROGRAM

## 2025-04-21 PROCEDURE — 99213 OFFICE O/P EST LOW 20 MIN: CPT | Performed by: STUDENT IN AN ORGANIZED HEALTH CARE EDUCATION/TRAINING PROGRAM

## 2025-04-21 PROCEDURE — 1160F RVW MEDS BY RX/DR IN RCRD: CPT | Performed by: STUDENT IN AN ORGANIZED HEALTH CARE EDUCATION/TRAINING PROGRAM

## 2025-04-21 RX ORDER — EXEMESTANE 25 MG/1
25 TABLET ORAL DAILY
COMMUNITY
Start: 2025-04-14

## (undated) DEVICE — SUTURE MONOCRYL + SZ 4-0 L18IN ABSRB UD L19MM PS-2 3/8 CIR MCP496G

## (undated) DEVICE — GLOVE SURG SZ 75 L12IN FNGR THK79MIL GRN LTX FREE

## (undated) DEVICE — BREAST: Brand: MEDLINE INDUSTRIES, INC.

## (undated) DEVICE — GLOVE,SURG,SENSICARE SLT,LF,PF,7: Brand: MEDLINE

## (undated) DEVICE — TOWEL,OR,DSP,ST,BLUE,STD,6/PK,12PK/CS: Brand: MEDLINE

## (undated) DEVICE — APPLICATOR MEDICATED 26 CC SOLUTION HI LT ORNG CHLORAPREP

## (undated) DEVICE — SUTURE VICRYL + SZ 2-0 L27IN ABSRB WHT SH 1/2 CIR TAPERCUT VCP417H

## (undated) DEVICE — COVER US PRB W12XL244CM SURGICAL INTRAOPERATIVE PLAS TAPR L

## (undated) DEVICE — SYRINGE MED 10ML LUERLOCK TIP W/O SFTY DISP

## (undated) DEVICE — GAUZE,SPONGE,4"X4",16PLY,XRAY,STRL,LF: Brand: MEDLINE

## (undated) DEVICE — PROBE SET W/DRAPE

## (undated) DEVICE — CLIP LIG M BLU TI HRT SHP WIRE HORZ 600 PER BX

## (undated) DEVICE — GAUZE,SPONGE,4"X4",8PLY,STRL,LF,10/TRAY: Brand: MEDLINE